# Patient Record
Sex: MALE | NOT HISPANIC OR LATINO | URBAN - METROPOLITAN AREA
[De-identification: names, ages, dates, MRNs, and addresses within clinical notes are randomized per-mention and may not be internally consistent; named-entity substitution may affect disease eponyms.]

---

## 2023-11-03 ENCOUNTER — OFFICE VISIT (OUTPATIENT)
Dept: URBAN - METROPOLITAN AREA CLINIC 23 | Facility: CLINIC | Age: 63
End: 2023-11-03
Payer: MEDICARE

## 2023-11-03 ENCOUNTER — LAB OUTSIDE AN ENCOUNTER (OUTPATIENT)
Dept: URBAN - METROPOLITAN AREA CLINIC 23 | Facility: CLINIC | Age: 63
End: 2023-11-03

## 2023-11-03 ENCOUNTER — DASHBOARD ENCOUNTERS (OUTPATIENT)
Age: 63
End: 2023-11-03

## 2023-11-03 VITALS
TEMPERATURE: 97.7 F | SYSTOLIC BLOOD PRESSURE: 151 MMHG | HEART RATE: 72 BPM | WEIGHT: 164 LBS | DIASTOLIC BLOOD PRESSURE: 92 MMHG | HEIGHT: 69 IN | BODY MASS INDEX: 24.29 KG/M2

## 2023-11-03 DIAGNOSIS — K57.32 DIVERTICULITIS, COLON: ICD-10-CM

## 2023-11-03 DIAGNOSIS — Z12.11 SCREENING FOR COLON CANCER: ICD-10-CM

## 2023-11-03 PROBLEM — 111359004: Status: ACTIVE | Noted: 2023-11-03

## 2023-11-03 PROCEDURE — 99204 OFFICE O/P NEW MOD 45 MIN: CPT | Performed by: INTERNAL MEDICINE

## 2023-11-03 RX ORDER — POLYETHYLENE GLYCOL-3350 AND ELECTROLYTES WITH FLAVOR PACK 240; 5.84; 2.98; 6.72; 22.72 G/278.26G; G/278.26G; G/278.26G; G/278.26G; G/278.26G
AS DIRECTED POWDER, FOR SOLUTION ORAL AS DIRECTED
Qty: 1 KIT | Refills: 0 | OUTPATIENT
Start: 2023-11-03 | End: 2023-11-04

## 2023-11-03 NOTE — HPI-TODAY'S VISIT:
63-year-old male presents for recurrence of diverticulitis.  He has been suffering from recurrent diverticulitis past 15 years.  2-3 episodes every year.  Most recent one in September, with pus formation managed with antibiotics.  No history of surgery. Last colonoscopy 15 years ago.  Normal. Denies constipation, unintentional weight loss, blood in stool. He takes psyllium husk daily.  He most bowel daily. He reports never seen a surgeon.

## 2023-11-03 NOTE — PREVIOUS WORKUP REVIEWED
Reviewed External Medical Record ENDOSCOPIES  LABS -Labs 8/31/2023: WBC 5.9, hemoglobin 14.9, platelet 194, BUN 8, creatinine 1.0, total bilirubin 0.8, alkaline phosphatase 66, AST 20, ALT 22.  IMAGES -CT abdomen pelvis with contrast 9/8/2023: 20 response to therapy with there was delusional with inflammatory changes in the sigmoid diverticulitis.  Still persistence thickened the appearance of the folds over the sigmoid colon without bowel obstruction. -CT abdomen pelvis with contrast 8/28/2023: Sigmoid diverticulitis with a small intramural abscess.  1.5 x 1.3 cm.

## 2023-11-30 ENCOUNTER — OFFICE VISIT (OUTPATIENT)
Dept: URBAN - METROPOLITAN AREA SURGERY CENTER 15 | Facility: SURGERY CENTER | Age: 63
End: 2023-11-30

## 2024-02-15 ENCOUNTER — COLON (OUTPATIENT)
Dept: URBAN - METROPOLITAN AREA SURGERY CENTER 15 | Facility: SURGERY CENTER | Age: 64
End: 2024-02-15